# Patient Record
(demographics unavailable — no encounter records)

---

## 2024-10-15 NOTE — REASON FOR VISIT
[Cardiac Failure] : cardiac failure [Coronary Artery Disease] : coronary artery disease [Other: ____] : [unfilled]

## 2024-10-18 NOTE — DISCUSSION/SUMMARY
[FreeTextEntry1] : 91 year man with a history as listed presents for a followup cardiac evaluation.  Rolly is doing well since his last visit. He denies any anginal symptoms. Clinically he is euvolemic on exam. His EKG is unchanged from previous.  He had a recent nuclear stress test at an outside facility in [place] that did not show any significant ischemia.  I repeated an echocardiogram that showed improved LV function to 51% since his previous echo.  His Zio patch showed frequent ectopy both APCs and PVCs.  No significant ventricular arrhythmias were noted.  I will increase his carvedilol to 3.125 mg every 12.  He will monitor for orthostatic symptoms. At this time he is high risk for his intermediate risk colonoscopy and potential necessary abdominal colovesicular surgery.  He has no modifiable risk factors at this time.  He may proceed with his procedures in the hospital setting with routine hemodynamic monitoring. He will continue with statin therapy to achieve maintain goal LDL<100 or ideally <70.  FU with neurosx on when ASA can be safely resumed.  He will fu with the cardiologist in [place].             [EKG obtained to assist in diagnosis and management of assessed problem(s)] : EKG obtained to assist in diagnosis and management of assessed problem(s)

## 2024-10-18 NOTE — PHYSICAL EXAM
[Frail] : frail [Normal Conjunctiva] : normal conjunctiva [No Carotid Bruit] : no carotid bruit [Rhythm Regular] : regular [Normal S1] : normal S1 [Normal S2] : normal S2 [No Murmur] : no murmurs heard [No Pitting Edema] : no pitting edema present [No Abnormalities] : the abdominal aorta was not enlarged and no bruit was heard [Clear Lung Fields] : clear lung fields [Soft] : abdomen soft [Non Tender] : non-tender [No Edema] : no edema [No Rash] : no rash [Moves all extremities] : moves all extremities [No Focal Deficits] : no focal deficits [Alert and Oriented] : alert and oriented [Right Carotid Bruit] : no bruit heard over the right carotid [Left Carotid Bruit] : no bruit heard over the left carotid [de-identified] : unsteady gait, wheelchair

## 2024-10-18 NOTE — CARDIOLOGY SUMMARY
[de-identified] : sinus rhythm  [de-identified] : long SR freq APCs, PVCs, 5 brief NSVT episode.s  [de-identified] : 6/10/24 Hospital: Technically difficult image quality. . Left ventricular cavity is normal in size. Left ventricular systolic function is moderately to severely decreased with an ejection fraction of 36 % by Gonzalez's method of disks. Regional wall motion abnormalities present. 9/2024 Left ventricular cavity is small. Left ventricular systolic function is mildly decreased with an ejection fraction of 51 % by Gonzalez's method of disks. Global left ventricular hypokinesis. 9/2024 Left ventricular cavity is small. Left ventricular systolic function is mildly decreased with an ejection fraction of 51 % by Gonzalez's method of disks. Global left ventricular hypokinesis. There is hypokinesis of the septum, basal to mid anterior wall, inferior wall, and mid to distal inferolateral wall.  4. There is mild (grade 1) left ventricular diastolic dysfunction, with normal filling pressure.  5. Normal right ventricular cavity size and normal systolic function.  6. The left atrium is normal in size.  7. Mitral valve leaflets have focal calcification.  8. Mild mitral regurgitation.  9. Trileaflet aortic valve with normal systolic excursion. There is calcification of the aortic valve leaflets. 10. Mild to moderate aortic regurgitation. 11. Structurally normal tricuspid valve with normal leaflet excursion. Mild tricuspid regurgitation.

## 2024-10-18 NOTE — HISTORY OF PRESENT ILLNESS
[FreeTextEntry1] : 91-year-old male with a history of hypertension, high cholesterol, GERD, CAD status post stents, prostate cancer in remission.   He initially presented with having some generalized weakness and fatigue over past few days.  The patient is recent had a UTI, was taking nitrofurantoin.  The patient was switched to Bactrim yesterday, after being told that the bug is resistant.  The patient is having persistent weakness, and today was very weak in the bathroom and ended up falling x 3.  Patient did not lose consciousness.  Patient hit his head.  CT imaging noted and he was admitted for further management for below- colovesicular fistula - diverticulitis -NAILA Echocardiogram demonstrating new systolic heart failure. Pro BNP in 700s. Negative troponins, hypokalemia-2.9 He was then [facility] sync[facility] episode with LOC. CT Head with B/L SAH and acute interhemispheric fissure subdural hematoma. B/W negative troponins  Since his last visit, he has been feeling relatively well. he denies any more falls or syncope. He is walking with a walker.  he recently saw neurosurgery and did not need any further interventions.   He   denies any chest pain, PND, orthopnea, lower extremity edema, near syncope, syncope, strokelike symptoms. Medication reconciliation performed. He is compliant with his medications.   he is planing on a colonscopy on 10/23/24 in [place] and potential colectomy.

## 2024-10-18 NOTE — HISTORY OF PRESENT ILLNESS
[FreeTextEntry1] : 91-year-old male with a history of hypertension, high cholesterol, GERD, CAD status post stents, prostate cancer in remission.   He initially presented with having some generalized weakness and fatigue over past few days.  The patient is recent had a UTI, was taking nitrofurantoin.  The patient was switched to Bactrim yesterday, after being told that the bug is resistant.  The patient is having persistent weakness, and today was very weak in the bathroom and ended up falling x 3.  Patient did not lose consciousness.  Patient hit his head.  CT imaging noted and he was admitted for further management for below- colovesicular fistula - diverticulitis -NAILA Echocardiogram demonstrating new systolic heart failure. Pro BNP in 700s. Negative troponins, hypokalemia-2.9 He was then [facility] episode with LOC. CT Head with B/L SAH and acute interhemispheric fissure subdural hematoma. B/W negative troponins  Since his last visit, he has been feeling relatively well. he denies any more falls or syncope. He is walking with a walker.  he recently saw neurosurgery and did not need any further interventions.   He   denies any chest pain, PND, orthopnea, lower extremity edema, near syncope, syncope, strokelike symptoms. Medication reconciliation performed. He is compliant with his medications.   he is planing on a colonscopy on 10/23/24 in [place] and potential colectomy.

## 2024-10-18 NOTE — END OF VISIT
[Time Spent: ___ minutes] : I have spent [unfilled] minutes of time on the encounter which excludes teaching and separately reported services. [FreeTextEntry3] : I saw and evaluated the patient and discussed the care with the NP provider above on 08/16/2024 . I agree with the findings and plan as documented in the note above. I personally reviewed all of the hospital records available to me at this time, which included but are not limited to the discharge summary, labs and imaging reports. spoke to pt and son at length. now with recent fall? with SDH. needs to fu with neurosx. holding all ac antiplatelets. reportedly had a neg nuc recently. will need an echo to reassess LV function. may need repeat ischemic testing in the future. would defer colonscopy for now. will likely need to be done in hosp setting when cleared.

## 2024-10-18 NOTE — REVIEW OF SYSTEMS
[Feeling Fatigued] : feeling fatigued [Negative] : Heme/Lymph [SOB] : no shortness of breath [Dyspnea on exertion] : not dyspnea during exertion [Chest Discomfort] : no chest discomfort [Lower Ext Edema] : no extremity edema [Leg Claudication] : no intermittent leg claudication [Palpitations] : no palpitations [Orthopnea] : no orthopnea [PND] : no PND [Syncope] : no syncope [de-identified] : dizziness on standing

## 2024-10-18 NOTE — REVIEW OF SYSTEMS
[Feeling Fatigued] : feeling fatigued [Negative] : Heme/Lymph [SOB] : no shortness of breath [Dyspnea on exertion] : not dyspnea during exertion [Chest Discomfort] : no chest discomfort [Lower Ext Edema] : no extremity edema [Leg Claudication] : no intermittent leg claudication [Palpitations] : no palpitations [Orthopnea] : no orthopnea [PND] : no PND [Syncope] : no syncope [de-identified] : dizziness on standing

## 2024-10-18 NOTE — PHYSICAL EXAM
[Frail] : frail [Normal Conjunctiva] : normal conjunctiva [No Carotid Bruit] : no carotid bruit [Rhythm Regular] : regular [Normal S1] : normal S1 [Normal S2] : normal S2 [No Murmur] : no murmurs heard [No Pitting Edema] : no pitting edema present [No Abnormalities] : the abdominal aorta was not enlarged and no bruit was heard [Clear Lung Fields] : clear lung fields [Soft] : abdomen soft [Non Tender] : non-tender [No Edema] : no edema [No Rash] : no rash [Moves all extremities] : moves all extremities [No Focal Deficits] : no focal deficits [Alert and Oriented] : alert and oriented [Right Carotid Bruit] : no bruit heard over the right carotid [Left Carotid Bruit] : no bruit heard over the left carotid [de-identified] : unsteady gait, wheelchair

## 2024-12-24 NOTE — HISTORY OF PRESENT ILLNESS
Okay, agree with plan [Patient reported hearing was abnormal] : Patient reported hearing was abnormal [Patient reported colon/rectal/cancer screening was normal] : Patient reported colon/rectal cancer screening was normal [Two or more falls in past year] : Patient reported two or more falls in the past year [Independent] : managing finances [] : Assistance needed with traveling/transport [Full assistance needed] : Assistance needed managing medications [Little interest or pleasure doing things] : 1) Little interest or pleasure doing things [Feeling down, depressed, or hopeless] : 2) Feeling down, depressed, or hopeless [1] : 2) Feeling down, depressed, or hopeless for several days (1) [PHQ-2 Positive] : PHQ-2 Positive [I have developed a follow-up plan documented below in the note.] : I have developed a follow-up plan documented below in the note. [FreeTextEntry1] : 90 yo male w/ a hx of CAD s/p PCI 2000, prostate ca s/p prostatectomy, Urosepsis (06/2024) secondary to [place] vesical fistula, diverticular disease, Hx of GI bleeds in the past, chronic dermatitis, open angle glaucoma, hearing loss (wears hearing aids) was seen via telehealth after patient was discharged from acute rehab following a hospitalization for an elective robotic assisted low anterior resection, colovesical fistula repair, mobilization of splenic flexure, left hemicolectomy and drainage of pelvic abscess along with cystoscopy and bilateral ureteral lighted stent placement by Urology. The hospitalization was complicated with anastomotic leak, ventilator dependent respiratory failure, ileus and continued pelvic abscess. He underwent ex-lap, small bowel resection, colostomy, take down of low anterior resection, bladder repair, lysis of adhesions, pelvic abscess drainage by colorectal surgery. During the hospitalization patient also had orthostatic hypotension. Cardiology was consulted for further evaluation. BP medications were adjusted by cardiology. He was admitted to inpatient rehab for PT/OT.   Has a right sided transverse colostomy. During the visit patient was accompanied by his daughter who helps provide history. She is also helping with his care. Pt is not able to empty his colostomy bag at this time. Patients daughter is currently helping with that as she was educated on how to at the hospital. Output from the ostomy reported to have liquid stool. Denies any hard stool or constipation. Daughter states they will likely need to hire HHAs to care for patient. Otherwise, patient states he has decreased appetite. He states everything tastes very bad. He has no other complaints.  Blood pressure continues to be on the lower end and he is taking midodrine 5mg TID.   TBI Patient continues to have left sided headaches. Following up with neurosurgery/   Anemia Patients baseline hemoglobin is usually around 8 Does report lightheadedness especially when he first gets up in the morning.   CHF Daughter states patient had reduced EF around 37%. Will be repeating echocardiogram. Was started on metoprolol but developed a rash. He was switched to carvedilol which is currently being weaned off due to his syncopal episode and hypotension.   Immunizations shingles: Due ,  Tdap: Up to date PCV: due, covid-19: up to date  [BoneDensityDate] : 06/2024 [TextBox_37] : has an appointment in 3 weeks  [TextBox_43] : has removable dentures  [TextBox_19] : around age 80s,  [FreeTextEntry6] : stopped driving 6 months to a year [[id number]] : 2

## 2024-12-24 NOTE — REVIEW OF SYSTEMS
[Feeling Tired] : feeling tired [As Noted in HPI] : as noted in HPI [Depression] : depression [Fever] : no fever [Chills] : no chills [Heart Rate Is Slow] : the heart rate was not slow [Heart Rate Is Fast] : the heart rate was not fast [Chest Pain] : no chest pain [Palpitations] : no palpitations [Shortness Of Breath] : no shortness of breath [Wheezing] : no wheezing [Cough] : no cough [Confused] : no confusion [Dizziness] : no dizziness

## 2024-12-24 NOTE — REASON FOR VISIT
[Home] : at home, [unfilled] , at the time of the visit. [Medical Office: ([facility])___] : at the medical office located in  [Family Member] : family member [Follow-Up] : a follow-up visit [FreeTextEntry2] : daughter, Carly [FreeTextEntry1] : post hospitalization

## 2024-12-24 NOTE — ASSESSMENT
[FreeTextEntry1] : Blood work ordered. Medications reconciled. hospital records reviewed. Candice BRICE will reach out to the family to help coordinate care.

## 2024-12-24 NOTE — PHYSICAL EXAM
[Alert] : alert [No Acute Distress] : in no acute distress [EOMI] : extraocular movements were intact [No Respiratory Distress] : no respiratory distress [No Acc Muscle Use] : no accessory muscle use [de-identified] : Limited due to telehealth visit.

## 2025-01-07 NOTE — REVIEW OF SYSTEMS
[Feeling Tired] : feeling tired [Depression] : depression [Fever] : no fever [Chills] : no chills [Heart Rate Is Slow] : the heart rate was not slow [Heart Rate Is Fast] : the heart rate was not fast [Chest Pain] : no chest pain [Palpitations] : no palpitations [Shortness Of Breath] : no shortness of breath [Wheezing] : no wheezing [Cough] : no cough [Abdominal Pain] : no abdominal pain [Vomiting] : no vomiting [Constipation] : no constipation [Diarrhea] : no diarrhea [Dysuria] : no dysuria [Confused] : no confusion [Dizziness] : no dizziness [Sleep Disturbances] : no sleep disturbances

## 2025-01-07 NOTE — HISTORY OF PRESENT ILLNESS
[1] : 1) Little interest or pleasure doing things for several days (1) [PHQ-2 Positive] : PHQ-2 Positive [I have developed a follow-up plan documented below in the note.] : I have developed a follow-up plan documented below in the note. [FreeTextEntry1] : 92 yo male w/ a hx of CAD s/p PCI 2000, prostate ca s/p prostatectomy, Urosepsis (06/2024) secondary to [place] vesical fistula, diverticular disease, Hx of GI bleeds in the past, chronic dermatitis, open angle glaucoma, hearing loss (wears hearing aids) was seen via telehealth. Pt states he is doing much better since the last telehealth visit that was done post hospitalization. His appetite and taste has improved slightly. He is also able to walk to the kitchen, living and back to his bedroom. He is participating in physical therapy as tolerated.   Patients blood pressures are on the lower end. Family is checking pressure 3 times a day. Holding carvedilol if systolic is < 120 and patient is holding midodrine if systolic bp is > 140.   TBI Patient continues to have left sided headaches. Following up with neurosurgery/   Anemia Last hgb: 9.8. Improvement in dizziness.  CHF Daughter states patient had reduced EF around 37%. Will be repeating echocardiogram. Taking carvedilol 3.125mg BID, holding if systolic is < 120  Immunizations shingles: Due ,  Tdap: Up to date covid-19: up to date  [Two or more falls in past year] : Patient reported two or more falls in the past year [Independent] : managing finances [] : Assistance needed with traveling/transport [Full assistance needed] : Assistance needed managing medications [FreeTextEntry6] : stopped driving 6 months to a year [[id number]] : 2

## 2025-01-07 NOTE — REASON FOR VISIT
[Home] : at home, [unfilled] , at the time of the visit. [Medical Office: ([facility])___] : at the medical office located in  [Follow-Up] : a follow-up visit [FreeTextEntry3] : daughter

## 2025-01-07 NOTE — PHYSICAL EXAM
[Alert] : alert [EOMI] : extraocular movements were intact [No Respiratory Distress] : no respiratory distress [No Acc Muscle Use] : no accessory muscle use [de-identified] : Limited due to telehealth visit.

## 2025-04-10 NOTE — PHYSICAL EXAM
[Alert] : alert [No Acute Distress] : in no acute distress [EOMI] : extraocular movements were intact [Normal Appearance] : the appearance of the neck was normal [Supple] : the neck was supple [No Respiratory Distress] : no respiratory distress [No Acc Muscle Use] : no accessory muscle use [Auscultation Breath Sounds / Voice Sounds] : lungs were clear to auscultation bilaterally [Normal S1, S2] : normal S1 and S2 [Heart Rate And Rhythm] : heart rate was normal and rhythm regular [Abdomen Tenderness] : non-tender [Abdomen Soft] : soft [No Focal Deficits] : no focal deficits [Normal Gait] : abnormal gait [de-identified] : colostomy bag noted in place,  [de-identified] : slow, unsteady gait

## 2025-04-10 NOTE — REVIEW OF SYSTEMS
[Cough] : cough [Depression] : depression [Fever] : no fever [Chills] : no chills [Feeling Tired] : not feeling tired [Heart Rate Is Slow] : the heart rate was not slow [Heart Rate Is Fast] : the heart rate was not fast [Chest Pain] : no chest pain [Palpitations] : no palpitations [Shortness Of Breath] : no shortness of breath [Wheezing] : no wheezing [Abdominal Pain] : no abdominal pain [Vomiting] : no vomiting [Dysuria] : no dysuria [Confused] : no confusion

## 2025-04-10 NOTE — HISTORY OF PRESENT ILLNESS
[No falls in past year] : Patient reported no falls in the past year [1] : 2) Feeling down, depressed, or hopeless for several days (1) [PHQ-2 Positive] : PHQ-2 Positive [I have developed a follow-up plan documented below in the note.] : I have developed a follow-up plan documented below in the note. [FreeTextEntry1] : 92 yo male w/ a hx of CAD s/p PCI 2000, prostate ca s/p prostatectomy, Urosepsis (06/2024) secondary to [place] vesical fistula, diverticular disease, Hx of GI bleeds in the past, chronic dermatitis, open angle glaucoma, hearing loss (wears hearing aids) presents to the office for a follow up visit. Pt is accompanied by his wife and daughter who help provide history. I also spoke to patients son Jude over the phone for additional history during the encounter.  Overall, patient is doing well. He has started gaining weight. He was started on Megace after his hospitalization. He continues to take it twice a day.  He is not very mobile, prefers to stay in bed/chair all day. He ambulates when PT comes to the house twice a day. I tried to set patient up with Lupe at the last visit. Patient refuses to go see a psychiatrist. He continues to have depressed mood and low motivation (reported by family). he also at times has angry outbursts with his daughter and wife.  Of note, patient is recovering from an upper respiratory infection. The whole family was sick. He continues to have a residual cough. Denies fever, chills,sob.  TBI Patient continues to have left sided headaches. Following up with neurosurgery/   Anemia Last hgb: 9.8. Denies dizziness.  CHF Daughter states patient had reduced EF around 37%. Taking carvedilol 3.125mg BID, holding if systolic is < 120  Immunizations shingles: Due ,  Tdap: Up to date covid-19: up to date  [[id number]] : 2

## 2025-05-28 NOTE — CARDIOLOGY SUMMARY
[de-identified] : sinus rhythm  [de-identified] : long SR freq APCs, PVCs, 5 brief NSVT episode.s  [de-identified] : 6/10/24 Hospital: Technically difficult image quality. . Left ventricular cavity is normal in size. Left ventricular systolic function is moderately to severely decreased with an ejection fraction of 36 % by Gonzalez's method of disks. Regional wall motion abnormalities present. 9/2024  Left ventricular cavity is small. Left ventricular systolic function is mildly decreased with an ejection fraction of 51 % by Gonzalez's method of disks. Global left ventricular hypokinesis. 9/2024 Left ventricular cavity is small. Left ventricular systolic function is mildly decreased with an ejection fraction of 51 % by Gonzalez's method of disks. Global left ventricular hypokinesis. 9/2024 Left ventricular cavity is small. Left ventricular systolic function is mildly decreased with an ejection fraction of 51 % by Gonzalez's method of disks. Global left ventricular hypokinesis. There is hypokinesis of the septum, basal to mid anterior wall, inferior wall, and mid to distal inferolateral wall.  4. There is mild (grade 1) left ventricular diastolic dysfunction, with normal filling pressure.  5. Normal right ventricular cavity size and normal systolic function.  6. The left atrium is normal in size.  7. Mitral valve leaflets have focal calcification.  8. Mild mitral regurgitation.  9. Trileaflet aortic valve with normal systolic excursion. There is calcification of the aortic valve leaflets. 10. Mild to moderate aortic regurgitation. 11. Structurally normal tricuspid valve with normal leaflet excursion. Mild tricuspid regurgitation.

## 2025-05-28 NOTE — HISTORY OF PRESENT ILLNESS
[FreeTextEntry1] : 91-year-old male with a history of hypertension, high cholesterol, GERD, CAD status post stents, prostate cancer in remission.   He initially presented with having some generalized weakness and fatigue over past few days.  The patient is recent had a UTI, was taking nitrofurantoin.  The patient was switched to Bactrim yesterday, after being told that the bug is resistant.  The patient is having persistent weakness, and today was very weak in the bathroom and ended up falling x 3.  Patient did not lose consciousness.  Patient hit his head.  CT imaging noted and he was admitted for further management for below- colovesicular fistula - diverticulitis -NAILA Echocardiogram demonstrating new systolic heart failure. Pro BNP in 700s. Negative troponins, hypokalemia-2.9 He was then [facility] syncope episode with LOC. CT Head with B/L SAH and acute interhemispheric fissure subdural hematoma. B/W negative troponins  Since his last visit, in 11/2024 he had a partial colectomy with his course complicated by hemorrhage requiring a colostomy in [place]. He then went to rehab. I personally reviewed all of the hospital records available to me at this time.  He is now doing PT/OT. He is pending cataract surgery with a potential lens implantation. He denies any dyspnea.  He   denies any chest pain, PND, orthopnea, lower extremity edema, near syncope, syncope, stroke like symptoms. Medication reconciliation performed. He is compliant with his medications.

## 2025-05-28 NOTE — DISCUSSION/SUMMARY
[FreeTextEntry1] : 92 year man with a history as listed presents for a followup cardiac evaluation.  Rolly is doing well since his last visit. He denies any anginal symptoms. Clinically he is euvolemic on exam. His EKG is unchanged from previous. he is not on ASA 2/2 bleeding.  He had a recent nuclear stress test at an outside facility in [place] that did not show any significant ischemia.  I repeated an echocardiogram that showed improved LV function to 51% since his previous echo.  His Zio patch showed frequent ectopy both APCs and PVCs.  No significant ventricular arrhythmias were noted.  He will continue  carvedilol  3.125 mg every 12.  He will monitor for orthostatic symptoms. Give his course was complicated by hypotension and bleeding he will need to repeat a echo to reassess his LV function.  He will continue with statin therapy to achieve maintain goal LDL<100 or ideally <70.  He currently has no active cardiac conditions. He may proceed with the planned low risk cataract surgery without any further cardiac workup. Routine hemodynamic monitoring is suggested during the procedure.  Exercise and diet counseling was performed in order to reduce her future cardiovascular risk.  He will followup with me in  4-6 months  or sooner if necessary.         [EKG obtained to assist in diagnosis and management of assessed problem(s)] : EKG obtained to assist in diagnosis and management of assessed problem(s)

## 2025-05-28 NOTE — PHYSICAL EXAM
[Frail] : frail [Normal Conjunctiva] : normal conjunctiva [No Carotid Bruit] : no carotid bruit [Rhythm Regular] : regular [Normal S1] : normal S1 [Normal S2] : normal S2 [No Murmur] : no murmurs heard [No Pitting Edema] : no pitting edema present [Right Carotid Bruit] : no bruit heard over the right carotid [Left Carotid Bruit] : no bruit heard over the left carotid [No Abnormalities] : the abdominal aorta was not enlarged and no bruit was heard [Clear Lung Fields] : clear lung fields [Soft] : abdomen soft [Non Tender] : non-tender [No Edema] : no edema [No Rash] : no rash [Moves all extremities] : moves all extremities [No Focal Deficits] : no focal deficits [Alert and Oriented] : alert and oriented [de-identified] : unsteady gait, wheelchair

## 2025-05-28 NOTE — REVIEW OF SYSTEMS
[Feeling Fatigued] : feeling fatigued [SOB] : no shortness of breath [Dyspnea on exertion] : not dyspnea during exertion [Chest Discomfort] : no chest discomfort [Lower Ext Edema] : no extremity edema [Leg Claudication] : no intermittent leg claudication [Palpitations] : no palpitations [Orthopnea] : no orthopnea [PND] : no PND [Syncope] : no syncope [Negative] : Heme/Lymph [de-identified] : dizziness on standing

## 2025-06-12 NOTE — PHYSICAL EXAM
[Alert] : alert [EOMI] : extraocular movements were intact [Normal Appearance] : the appearance of the neck was normal [Supple] : the neck was supple [No Respiratory Distress] : no respiratory distress [No Acc Muscle Use] : no accessory muscle use [Auscultation Breath Sounds / Voice Sounds] : lungs were clear to auscultation bilaterally [Normal S1, S2] : normal S1 and S2 [Heart Rate And Rhythm] : heart rate was normal and rhythm regular [Abdomen Tenderness] : non-tender [Abdomen Soft] : soft [No Focal Deficits] : no focal deficits [Normal Gait] : abnormal gait [de-identified] : colostomy bag noted in place,  [de-identified] : slow, unsteady gait

## 2025-06-12 NOTE — REVIEW OF SYSTEMS
[Feeling Tired] : feeling tired [Eyesight Problems] : eyesight problems [Loss Of Hearing] : hearing loss [Sleep Disturbances] : sleep disturbances [Depression] : depression [Fever] : no fever [Chills] : no chills [Heart Rate Is Slow] : the heart rate was not slow [Chest Pain] : no chest pain [Palpitations] : no palpitations [Lower Ext Edema] : no extremity edema [Shortness Of Breath] : no shortness of breath [Wheezing] : no wheezing [Cough] : no cough [Abdominal Pain] : no abdominal pain [Vomiting] : no vomiting

## 2025-06-12 NOTE — HISTORY OF PRESENT ILLNESS
[Patient reported hearing was abnormal] : Patient reported hearing was abnormal [Patient reported colon/rectal/cancer screening was normal] : Patient reported colon/rectal cancer screening was normal [No falls in past year] : Patient reported no falls in the past year [FreeTextEntry1] : 92 yo male w/ a hx of CAD s/p PCI 2000, prostate ca s/p prostatectomy, Urosepsis (06/2024) secondary to [place] vesical fistula, diverticular disease, Hx of GI bleeds in the past, chronic dermatitis, open angle glaucoma, hearing loss (wears hearing aids) presents to the office for a follow up visit. Pt is accompanied by his wife and daughter who help provide history as well.  Since the last visit patient started virtual therapy. Daughter states patients mood has improved since then. Patient himself believes there is no difference.  Patient continues to receive occupational therapy.  PT session were completed.  Patient has no acute complaints. No falls reported.  Weight loss Pt continues to take Megace at a reduce dose. (managed  by patients son)   TBI Patient continues to have left sided headaches. Following up with neurosurgery/   Anemia Last hgb: 9.8. Denies dizziness.  CHF Daughter states patient had reduced EF around 37%. Taking carvedilol 3.125mg BID, holding if systolic is < 120  Immunizations shingles: Due ,  Tdap: Up to date covid-19: up to date  [BoneDensityDate] : 06/2024 [TextBox_43] : has removable dentures  [TextBox_37] : has an appointment in 3 weeks  [TextBox_19] : around age 80s,